# Patient Record
(demographics unavailable — no encounter records)

---

## 2025-06-20 NOTE — HISTORY OF PRESENT ILLNESS
[de-identified] :  Ms. Gonzalez is a 27 year old woman who comes in for evaluation for bilateral foot and calf pain and tightness that started about a year ago without specific injury. She is a runner and was training for a race at the time.  She had run in the past when she was living in [place] a few years ago without any issues ever. Before this pain started she had ITB syndrome and had done physical therapy and the ITB got better.  She got back to running and was trying to build up and developed pain.  Pain occurs with running or walking quickly. She has been seeing her physical therapist for almost year and it's still bothering her when running. She stopped running about a month ago to allow for more rest. She describes a cramping type feeling that starts about a half mile into a run.  When she stops and rests it gets better.  She has tried different sneakers.  When she walks at home barefoot she never has pain.  She also tried inserts/orthotics in her shoes without relief.  Her pain is 8/10 at its worst. She feels it with running and when walking at a fast pace.  She has had a couple RIGHT ankle sprains in the past.  No numbness or tingling.  She takes ibuprofen occasionally.  Occasionally the legs may look swollen.  The pain is more on the medial calf and lower leg and not anterolateral.  The foot pain is more lateral.  She gets some back pain but never gets sciatica no numbness or tingling. She has tried compression stockings.

## 2025-06-20 NOTE — ASSESSMENT
[FreeTextEntry1] : 28 y/o female having pain in both legs and feet that occur only with running or walking quickly that started a year ago.  The pattern of her pain seems most consistent with chronic exertional compartment syndrome although her pain seems to be more medial in the calf than the anterior or lateral compartments which may more commonly be involved. She has good pulses and does not seem to have a vascular issue.  She has had some back pain but never has sciatica and the pain does not seem consistent with a lumbar radiculopathy. I recommended getting MRIs to see if there is anything structurally found to explain her pain in the soft tissues or bone such as a stress fracture or inflammation. I will call her with those results. She can do the exercises that she learned in therapy with stretching and strengthening and try to walk or keep activity at a level that does not reproduce the pain at all. If the MRIs are normal then the next step may be to do exertional compartment testing using a [employer] monitor to check pressures.

## 2025-06-20 NOTE — PHYSICAL EXAM
[LE] : Sensory: Intact in bilateral lower extremities [Normal RLE] : Right Lower Extremity: No scars, rashes, lesions, ulcers, skin intact [Normal LLE] : Left Lower Extremity: No scars, rashes, lesions, ulcers, skin intact [Normal Touch] : sensation intact for touch [Normal] : Oriented to person, place, and time, insight and judgement were intact and the affect was normal [de-identified] : Bilateral lower legs, feet and ankles Normal gait and she can walk on her heels and toes without any pain or difficulty. Normal arch dynamics. No edema, ecchymoses, erythema, deformity. No specific tenderness except mildly on the distal posteromedial tibia bilaterally. No foot tenderness. Compartments feel slightly on the tight side but nontender at this time. 5/5 anterior tibial tendon, gastrocsoleus, peroneals, posterior tibial tendon, EHL. Sensation is intact. Foot is warm with normal capillary refill. Intact dorsalis pedis and posterior tibial pulses. [de-identified] : X-rays ordered, performed and reviewed today of bilateral feet for foot pain weightbearing 3 views showed no fractures or abnormalities   X-rays ordered, performed for intermittent leg pain and reviewed today of bilateral tibia/fibula 2 views showed no evidence of stress fractures or any soft tissue or bone lesions.  There may be some thickening of the cortex of the bone but no fractures

## 2025-06-20 NOTE — PHYSICAL EXAM
[LE] : Sensory: Intact in bilateral lower extremities [Normal RLE] : Right Lower Extremity: No scars, rashes, lesions, ulcers, skin intact [Normal LLE] : Left Lower Extremity: No scars, rashes, lesions, ulcers, skin intact [Normal Touch] : sensation intact for touch [Normal] : Oriented to person, place, and time, insight and judgement were intact and the affect was normal [de-identified] : Bilateral lower legs, feet and ankles Normal gait and she can walk on her heels and toes without any pain or difficulty. Normal arch dynamics. No edema, ecchymoses, erythema, deformity. No specific tenderness except mildly on the distal posteromedial tibia bilaterally. No foot tenderness. Compartments feel slightly on the tight side but nontender at this time. 5/5 anterior tibial tendon, gastrocsoleus, peroneals, posterior tibial tendon, EHL. Sensation is intact. Foot is warm with normal capillary refill. Intact dorsalis pedis and posterior tibial pulses. [de-identified] : X-rays ordered, performed and reviewed today of bilateral feet for foot pain weightbearing 3 views showed no fractures or abnormalities   X-rays ordered, performed for intermittent leg pain and reviewed today of bilateral tibia/fibula 2 views showed no evidence of stress fractures or any soft tissue or bone lesions.  There may be some thickening of the cortex of the bone but no fractures

## 2025-06-20 NOTE — HISTORY OF PRESENT ILLNESS
[de-identified] :  Ms. Gonzalez is a 27 year old woman who comes in for evaluation for bilateral foot and calf pain and tightness that started about a year ago without specific injury. She is a runner and was training for a race at the time.  She had run in the past when she was living in [place] a few years ago without any issues ever. Before this pain started she had ITB syndrome and had done physical therapy and the ITB got better.  She got back to running and was trying to build up and developed pain.  Pain occurs with running or walking quickly. She has been seeing her physical therapist for almost year and it's still bothering her when running. She stopped running about a month ago to allow for more rest. She describes a cramping type feeling that starts about a half mile into a run.  When she stops and rests it gets better.  She has tried different sneakers.  When she walks at home barefoot she never has pain.  She also tried inserts/orthotics in her shoes without relief.  Her pain is 8/10 at its worst. She feels it with running and when walking at a fast pace.  She has had a couple RIGHT ankle sprains in the past.  No numbness or tingling.  She takes ibuprofen occasionally.  Occasionally the legs may look swollen.  The pain is more on the medial calf and lower leg and not anterolateral.  The foot pain is more lateral.  She gets some back pain but never gets sciatica no numbness or tingling. She has tried compression stockings.

## 2025-06-20 NOTE — ASSESSMENT
[FreeTextEntry1] : 26 y/o female having pain in both legs and feet that occur only with running or walking quickly that started a year ago.  The pattern of her pain seems most consistent with chronic exertional compartment syndrome although her pain seems to be more medial in the calf than the anterior or lateral compartments which may more commonly be involved. She has good pulses and does not seem to have a vascular issue.  She has had some back pain but never has sciatica and the pain does not seem consistent with a lumbar radiculopathy. I recommended getting MRIs to see if there is anything structurally found to explain her pain in the soft tissues or bone such as a stress fracture or inflammation. I will call her with those results. She can do the exercises that she learned in therapy with stretching and strengthening and try to walk or keep activity at a level that does not reproduce the pain at all. If the MRIs are normal then the next step may be to do exertional compartment testing using a [employer] monitor to check pressures.